# Patient Record
Sex: MALE | Race: ASIAN | NOT HISPANIC OR LATINO | URBAN - METROPOLITAN AREA
[De-identification: names, ages, dates, MRNs, and addresses within clinical notes are randomized per-mention and may not be internally consistent; named-entity substitution may affect disease eponyms.]

---

## 2022-05-09 ENCOUNTER — LAB OUTSIDE AN ENCOUNTER (OUTPATIENT)
Dept: URBAN - METROPOLITAN AREA CLINIC 33 | Facility: CLINIC | Age: 57
End: 2022-05-09

## 2022-05-09 ENCOUNTER — OFFICE VISIT (OUTPATIENT)
Dept: URBAN - METROPOLITAN AREA CLINIC 33 | Facility: CLINIC | Age: 57
End: 2022-05-09
Payer: COMMERCIAL

## 2022-05-09 VITALS
BODY MASS INDEX: 21.4 KG/M2 | SYSTOLIC BLOOD PRESSURE: 102 MMHG | HEIGHT: 72 IN | WEIGHT: 158 LBS | DIASTOLIC BLOOD PRESSURE: 74 MMHG | HEART RATE: 55 BPM | OXYGEN SATURATION: 97 %

## 2022-05-09 DIAGNOSIS — K21.9 GASTROESOPHAGEAL REFLUX DISEASE, UNSPECIFIED WHETHER ESOPHAGITIS PRESENT: ICD-10-CM

## 2022-05-09 DIAGNOSIS — Z80.0 FAMILY HISTORY OF COLON CANCER IN FATHER: ICD-10-CM

## 2022-05-09 PROCEDURE — 99203 OFFICE O/P NEW LOW 30 MIN: CPT | Performed by: INTERNAL MEDICINE

## 2022-05-09 RX ORDER — SODIUM PICOSULFATE, MAGNESIUM OXIDE, AND ANHYDROUS CITRIC ACID 10; 3.5; 12 MG/160ML; G/160ML; G/160ML
160 ML LIQUID ORAL
Qty: 320 MILLILITER | Refills: 0 | OUTPATIENT
Start: 2022-05-09 | End: 2022-05-11

## 2022-05-09 RX ORDER — ATORVASTATIN CALCIUM 10 MG/1
1 TABLET TABLET, FILM COATED ORAL ONCE A DAY
Status: ACTIVE | COMMUNITY

## 2022-05-09 NOTE — HPI-HEARTBURN
55 y/o male patient presents today for consultation about heartburn, which is described as a burning sensation.  Patient admits the pain usually occurs postprandial and will last for several minutes. He states that it is located in the middle of the chest. Patient denies associated symptoms such as chills, nausea, vomiting, fever or SOB. Patient does admit frequent indigestion and bloating with gas.   Patient currently denies taking any medications for relief of symptoms.  Patient admits past EGD in 2014 with normal findings.

## 2022-05-09 NOTE — HPI-COLONOSCOPY SCREENING
57 y/o male patient presents today for a consultation for a colorectal cancer screening. Patient denies this is his first colonoscopy. Patient currently admits 1 formed bowel movement per day with no melena, mucus, or blood in stools. Patient denies abdominal pain, but does admit bloating, gas, and heartburn.   Patient admits having a colonoscopy in 2016 with normal findings.  Patient admits a family hx of colon, gastric, or esophageal cancer/polyps. Father passed away from colon cancer.

## 2022-06-09 PROBLEM — 235595009: Status: ACTIVE | Noted: 2022-05-09

## 2022-06-23 ENCOUNTER — TELEPHONE ENCOUNTER (OUTPATIENT)
Dept: URBAN - METROPOLITAN AREA CLINIC 35 | Facility: CLINIC | Age: 57
End: 2022-06-23

## 2022-06-29 ENCOUNTER — OFFICE VISIT (OUTPATIENT)
Dept: URBAN - METROPOLITAN AREA MEDICAL CENTER 10 | Facility: MEDICAL CENTER | Age: 57
End: 2022-06-29
Payer: COMMERCIAL

## 2022-06-29 DIAGNOSIS — K31.A11 GASTRIC INTESTINAL METAPLASIA WITHOUT DYSPLASIA, INVOLVING THE ANTRUM: ICD-10-CM

## 2022-06-29 DIAGNOSIS — K22.89 DILATATION OF ESOPHAGUS: ICD-10-CM

## 2022-06-29 DIAGNOSIS — Z12.11 COLON CANCER SCREENING: ICD-10-CM

## 2022-06-29 DIAGNOSIS — Z80.0 BROTHER AT YOUNG AGE FAMILY HISTORY OF COLON CANCER: ICD-10-CM

## 2022-06-29 DIAGNOSIS — K29.40 ATROPHIC GASTRITIS: ICD-10-CM

## 2022-06-29 PROCEDURE — G0105 COLORECTAL SCRN; HI RISK IND: HCPCS | Performed by: INTERNAL MEDICINE

## 2022-06-29 PROCEDURE — 43239 EGD BIOPSY SINGLE/MULTIPLE: CPT | Performed by: INTERNAL MEDICINE

## 2022-07-14 ENCOUNTER — DASHBOARD ENCOUNTERS (OUTPATIENT)
Age: 57
End: 2022-07-14

## 2022-07-14 ENCOUNTER — OFFICE VISIT (OUTPATIENT)
Dept: URBAN - METROPOLITAN AREA CLINIC 35 | Facility: CLINIC | Age: 57
End: 2022-07-14
Payer: COMMERCIAL

## 2022-07-14 VITALS
WEIGHT: 159 LBS | HEIGHT: 72 IN | HEART RATE: 61 BPM | SYSTOLIC BLOOD PRESSURE: 108 MMHG | OXYGEN SATURATION: 98 % | DIASTOLIC BLOOD PRESSURE: 70 MMHG | BODY MASS INDEX: 21.54 KG/M2

## 2022-07-14 DIAGNOSIS — K29.50 OTHER CHRONIC GASTRITIS WITHOUT HEMORRHAGE: ICD-10-CM

## 2022-07-14 DIAGNOSIS — K20.90 ESOPHAGITIS: ICD-10-CM

## 2022-07-14 PROBLEM — 8493009: Status: ACTIVE | Noted: 2022-07-14

## 2022-07-14 PROCEDURE — 99213 OFFICE O/P EST LOW 20 MIN: CPT | Performed by: INTERNAL MEDICINE

## 2022-07-14 RX ORDER — OMEPRAZOLE 40 MG/1
1 CAPSULE 30 MINUTES BEFORE MORNING MEAL CAPSULE, DELAYED RELEASE ORAL ONCE A DAY
Qty: 30 | Refills: 11 | OUTPATIENT
Start: 2022-07-14

## 2022-07-14 RX ORDER — FAMOTIDINE 40 MG/1
1 TABLET AT BEDTIME TABLET, FILM COATED ORAL ONCE A DAY
Qty: 30 | Refills: 11 | OUTPATIENT
Start: 2022-07-14

## 2022-07-14 RX ORDER — ATORVASTATIN CALCIUM 10 MG/1
1 TABLET TABLET, FILM COATED ORAL ONCE A DAY
Status: ON HOLD | COMMUNITY

## 2022-07-14 NOTE — HPI-COLONOSCOPY SCREENING
56 y/o male patient presents today for follow up to his colonoscopy, which was completed on 06/29/2022 by Dr. Saravanan Banegas. Patient denies any complications after his procedure. Patient currently admits 1 formed bowel movement per day. Patient denies any melena, blood or mucus in stools. Patient denies any associated abdominal pain, heartburn, bloating, or gas.   Colonoscopy report shows: -Preparation of the colon was fair. -The entire examined colon is normal. -No specimens collected.                Last visit (05/09/2022): 55 y/o male patient presents today for a consultation for a colorectal cancer screening. Patient denies this is his first colonoscopy. Patient currently admits 1 formed bowel movement per day with no melena, mucus, or blood in stools. Patient denies abdominal pain, but does admit bloating, gas, and heartburn.   Patient admits having a colonoscopy in 2016 with normal findings.  Patient admits a family hx of colon, gastric, or esophageal cancer/polyps. Father passed away from colon cancer.

## 2022-07-14 NOTE — HPI-HEARTBURN
56 y/o female patient presents today for follow-up to his EGD, which was completed on 06/29/2022 by Dr. Saravanan Banegas. Patient denies any complications after his procedure. Since the procedure, the patient denies dysphagia, globus, changes in appetite, abdominal/epigastric pain or changes in bowel habits.   EGD report shows: -LA Grade A reflux esophagitis with no bleeding. Biopsied. -Gastritis. Biopsied. -Normal examined duodenum.                    Last visit (05/09/2022): 56 y/o male patient presents today for consultation about heartburn, which is described as a burning sensation.  Patient admits the pain usually occurs postprandial and will last for several minutes. He states that it is located in the middle of the chest. Patient denies associated symptoms such as chills, nausea, vomiting, fever or SOB. Patient does admit frequent indigestion and bloating with gas.   Patient currently denies taking any medications for relief of symptoms.  Patient admits past EGD in 2014 with normal findings.